# Patient Record
(demographics unavailable — no encounter records)

---
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
## 2025-01-02 NOTE — DISCUSSION/SUMMARY
[PRN] : PRN [de-identified] :  I have discussed the underlying pathophysiology of the patient's condition. Pt is happy with his results.  He is not sure if he wants his hardware removed. Patient should follow up with me as needed.

## 2025-01-02 NOTE — PHYSICAL EXAM
[de-identified] : Patient is WDWN, alert, and in no acute distress. Breathing is unlabored. He is grossly oriented to person, place, and time.  Presents with his mother.  Incision is healed well. There are no signs of infection. No edema and tenderness noted. FROM Normal sensation [de-identified] :  AP, lateral, and oblique views of the Right Ankle were obtained today and revealed  the fracture is fully healed with  An 8-hole one-third tubular plate with multiple locked and nonlocked screws. Hardware is well aligned. No evidence of arthritis.

## 2025-01-02 NOTE — ADDENDUM
[FreeTextEntry1] :  I, Edith Thompson, acted solely as a scribe for Dr. Martínez Nur on this date 01/02/2025 .  All medical record entries made by the Scribe were at my, Dr. Martínez Nur, direction and personally dictated by me on 01/02/2025 I have reviewed the chart and agree that the record accurately reflects my personal performance of the history, physical exam, assessment and plan. I have also personally directed, reviewed, and agreed with the chart.

## 2025-01-02 NOTE — HISTORY OF PRESENT ILLNESS
[de-identified] : The patient is a 21-year M who returns after undergoing Open reduction and internal fixation of right ankle bimalleolar fracture, Open reduction and internal fixation of right ankle syndesmosis. Application of right short leg splint. at [facility]. The surgery was on 8/22/2023. He had removal of hardware of the left ankle on 10/27/23. The patient was recovering at home.    Today, Jan 02, 2025 patient is here for a follow up and further evaluation. He states that he is no longer in pain.